# Patient Record
Sex: FEMALE | Race: WHITE | Employment: OTHER | URBAN - METROPOLITAN AREA
[De-identification: names, ages, dates, MRNs, and addresses within clinical notes are randomized per-mention and may not be internally consistent; named-entity substitution may affect disease eponyms.]

---

## 2017-04-24 RX ORDER — CARBAMAZEPINE 200 MG/1
TABLET ORAL
Qty: 360 TABLET | Refills: 0 | Status: SHIPPED | OUTPATIENT
Start: 2017-04-24 | End: 2017-07-12 | Stop reason: SDUPTHER

## 2017-06-26 DIAGNOSIS — G40.209 PARTIAL SYMPTOMATIC EPILEPSY WITH COMPLEX PARTIAL SEIZURES, NOT INTRACTABLE, WITHOUT STATUS EPILEPTICUS (HCC): ICD-10-CM

## 2017-07-12 ENCOUNTER — OFFICE VISIT (OUTPATIENT)
Dept: NEUROLOGY | Age: 57
End: 2017-07-12
Payer: COMMERCIAL

## 2017-07-12 VITALS
HEART RATE: 56 BPM | SYSTOLIC BLOOD PRESSURE: 93 MMHG | HEIGHT: 66 IN | BODY MASS INDEX: 23.01 KG/M2 | DIASTOLIC BLOOD PRESSURE: 60 MMHG | WEIGHT: 143.2 LBS

## 2017-07-12 DIAGNOSIS — G40.209 PARTIAL SYMPTOMATIC EPILEPSY WITH COMPLEX PARTIAL SEIZURES, NOT INTRACTABLE, WITHOUT STATUS EPILEPTICUS (HCC): Primary | ICD-10-CM

## 2017-07-12 PROCEDURE — 99214 OFFICE O/P EST MOD 30 MIN: CPT | Performed by: PSYCHIATRY & NEUROLOGY

## 2017-07-12 RX ORDER — CARBAMAZEPINE 200 MG/1
400 TABLET ORAL 2 TIMES DAILY
Qty: 120 TABLET | Refills: 5 | Status: SHIPPED | OUTPATIENT
Start: 2017-07-12 | End: 2017-07-13 | Stop reason: SDUPTHER

## 2017-07-13 DIAGNOSIS — G40.209 PARTIAL SYMPTOMATIC EPILEPSY WITH COMPLEX PARTIAL SEIZURES, NOT INTRACTABLE, WITHOUT STATUS EPILEPTICUS (HCC): ICD-10-CM

## 2017-07-17 RX ORDER — CARBAMAZEPINE 200 MG/1
400 TABLET ORAL 2 TIMES DAILY
Qty: 360 TABLET | Refills: 5 | OUTPATIENT
Start: 2017-07-17 | End: 2017-11-21 | Stop reason: SDUPTHER

## 2017-11-21 DIAGNOSIS — G40.209 PARTIAL SYMPTOMATIC EPILEPSY WITH COMPLEX PARTIAL SEIZURES, NOT INTRACTABLE, WITHOUT STATUS EPILEPTICUS (HCC): ICD-10-CM

## 2017-11-21 RX ORDER — CARBAMAZEPINE 200 MG/1
400 TABLET ORAL 2 TIMES DAILY
Qty: 360 TABLET | Refills: 8 | Status: SHIPPED | OUTPATIENT
Start: 2017-11-21 | End: 2018-03-16 | Stop reason: SDUPTHER

## 2017-11-21 NOTE — TELEPHONE ENCOUNTER
Hope is needing a refill on  Tegretol 200 mg. 2 bid. She needs it sent to [facility] in [place].

## 2018-03-16 DIAGNOSIS — G40.209 PARTIAL SYMPTOMATIC EPILEPSY WITH COMPLEX PARTIAL SEIZURES, NOT INTRACTABLE, WITHOUT STATUS EPILEPTICUS (HCC): ICD-10-CM

## 2018-03-19 RX ORDER — CARBAMAZEPINE 200 MG/1
400 TABLET ORAL 2 TIMES DAILY
Qty: 56 TABLET | Refills: 0 | Status: SHIPPED | OUTPATIENT
Start: 2018-03-19 | End: 2018-07-12 | Stop reason: SDUPTHER

## 2018-06-01 LAB
ALT SERPL-CCNC: 25 U/L (ref 12–78)
BUN BLDV-MCNC: 14 MG/DL (ref 7–18)
CARBAMAZEPINE LEVEL: 7.5 UG/ML (ref 4–12)
CHLORIDE BLD-SCNC: 108 MMOL/L (ref 97–107)
CO2: 29 MMOL/L (ref 21–32)
CREAT SERPL-MCNC: 0.66 MG/DL (ref 0.55–1.02)
HCT VFR BLD CALC: 40.4 % (ref 34.6–44.1)
HEMOGLOBIN: 13.9 G/DL (ref 11.7–14.9)
MCH RBC QN AUTO: 33.4 PG (ref 27.8–33.2)
MCHC RBC AUTO-ENTMCNC: 34.4 G/DL (ref 32.7–34.8)
MCV RBC AUTO: 97 FL (ref 83–97.4)
PDW BLD-RTO: 12.6 % (ref 12.2–15.8)
PLATELET # BLD: 210 10'3/UL (ref 122–359)
PMV BLD AUTO: 7.6 FL (ref 7.6–10.6)
POTASSIUM SERPL-SCNC: 4.3 MMOL/L (ref 3.5–5.1)
RBC # BLD: 4.16 10'6/UL (ref 3.85–4.88)
SODIUM BLD-SCNC: 141 MMOL/L (ref 136–145)
WBC: 3 10'3/UL (ref 3.2–9.3)

## 2018-06-04 DIAGNOSIS — G40.209 PARTIAL SYMPTOMATIC EPILEPSY WITH COMPLEX PARTIAL SEIZURES, NOT INTRACTABLE, WITHOUT STATUS EPILEPTICUS (HCC): ICD-10-CM

## 2018-07-12 ENCOUNTER — OFFICE VISIT (OUTPATIENT)
Dept: NEUROLOGY | Age: 58
End: 2018-07-12
Payer: COMMERCIAL

## 2018-07-12 VITALS
BODY MASS INDEX: 22.73 KG/M2 | SYSTOLIC BLOOD PRESSURE: 104 MMHG | DIASTOLIC BLOOD PRESSURE: 66 MMHG | HEIGHT: 66 IN | WEIGHT: 141.4 LBS | HEART RATE: 54 BPM

## 2018-07-12 DIAGNOSIS — G40.209 PARTIAL SYMPTOMATIC EPILEPSY WITH COMPLEX PARTIAL SEIZURES, NOT INTRACTABLE, WITHOUT STATUS EPILEPTICUS (HCC): ICD-10-CM

## 2018-07-12 PROCEDURE — 99214 OFFICE O/P EST MOD 30 MIN: CPT | Performed by: NURSE PRACTITIONER

## 2018-07-12 RX ORDER — CARBAMAZEPINE 200 MG/1
400 TABLET ORAL 2 TIMES DAILY
Qty: 360 TABLET | Refills: 3 | Status: SHIPPED | OUTPATIENT
Start: 2018-07-12 | End: 2019-07-10 | Stop reason: SDUPTHER

## 2018-07-12 NOTE — PROGRESS NOTES
extension and rotation;   trachea midline;  no adenopathy;   thyroid: not enlarged;   no carotid pulse abnormality   Back:   Symmetric, no curvature, ROM adequate   Lungs:   Respirations unlabored   Heart:  Regular rate and rhythm           Extremities: Extremities normal, no cyanosis, no edema   Pulses: Symmetric over head and neck   Skin: Skin color, texture normal, no rashes, no lesions                                             NEUROLOGIC EXAMINATION    Neurologic Exam     Mental Status   Oriented to person, place, and time. Attention: normal.   Speech: speech is normal   Level of consciousness: alert  Normal comprehension. Cranial Nerves     CN II   Visual fields full to confrontation. CN III, IV, VI   Pupils are equal, round, and reactive to light. Extraocular motions are normal.     CN V   Facial sensation intact. CN VII   Facial expression full, symmetric. CN VIII   CN VIII normal.     CN IX, X   CN IX normal.     CN XI   CN XI normal.     CN XII   CN XII normal.     Motor Exam   Muscle bulk: normal  Overall muscle tone: normal  Right arm tone: normal  Left arm tone: normal  Right arm pronator drift: absent  Left arm pronator drift: absent  Right leg tone: normal  Left leg tone: normal    Strength   Strength 5/5 throughout. Sensory Exam   Light touch normal.   Vibration normal.   Pinprick normal.     Gait, Coordination, and Reflexes     Gait  Gait: normal    Coordination   Finger to nose coordination: normal    Tremor   Resting tremor: absent    Reflexes   Right brachioradialis: 2+  Left brachioradialis: 2+  Right biceps: 2+  Left biceps: 2+  Right triceps: 2+  Left triceps: 2+  Right patellar: 2+  Left patellar: 2+  Right achilles: 2+  Left achilles: 2+  Right plantar: normal  Left plantar: normal            ASSESSMENT/PLAN:       In summary, your patient, Cindi Orlando exhibits the following, with associated plan:    1.  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 1. Patient will continue Tegretol 200 mg twice daily  2. Will return in follow-up in 1 year with repeat CBC, BMP, liver function studies, and carbamazepine level.             Signed: Bree Barry, CNP      *Please note that portions of this note were completed with a voice recognition program.  Although every effort was made to insure the accuracy of this automated transcription, some errors in transcription may have occurred, occasionally words and are mis-transcribed

## 2019-07-10 ENCOUNTER — OFFICE VISIT (OUTPATIENT)
Dept: NEUROLOGY | Age: 59
End: 2019-07-10
Payer: COMMERCIAL

## 2019-07-10 VITALS
WEIGHT: 143.2 LBS | BODY MASS INDEX: 23.01 KG/M2 | HEIGHT: 66 IN | HEART RATE: 57 BPM | SYSTOLIC BLOOD PRESSURE: 89 MMHG | DIASTOLIC BLOOD PRESSURE: 60 MMHG

## 2019-07-10 DIAGNOSIS — G40.209 PARTIAL SYMPTOMATIC EPILEPSY WITH COMPLEX PARTIAL SEIZURES, NOT INTRACTABLE, WITHOUT STATUS EPILEPTICUS (HCC): ICD-10-CM

## 2019-07-10 PROCEDURE — 99213 OFFICE O/P EST LOW 20 MIN: CPT | Performed by: NURSE PRACTITIONER

## 2019-07-10 RX ORDER — CARBAMAZEPINE 200 MG/1
400 TABLET ORAL 2 TIMES DAILY
Qty: 360 TABLET | Refills: 3 | Status: SHIPPED | OUTPATIENT
Start: 2019-07-10 | End: 2020-04-23 | Stop reason: SDUPTHER

## 2019-07-10 NOTE — PROGRESS NOTES
[facility]            [address]          [place], [address]          Dept: [phone]          Dept Fax: [phone]        MD Nancy Gray MD Ahmed B. Miguel Spiegel, MD Dennison Jackson, MD Kerri Connors, MD Robinson Eke, CNP            7/10/2019      HISTORY OF PRESENT ILLNESS:       I had the pleasure of seeing Amando Kerr, who returns for continuing neurologic care. Patient is a 61-year-old woman who was seen last on July 12, 2018 for management of a complex partial seizure disorder. Patient seizures began in 1978 and she remained seizure-free until 1993 when she had a breakthrough seizure. This was the last seizure event that has occurred. She is maintained on Tegretol 200 mg twice daily. She denies any side effects to the medications and her last laboratory tests were normal.  On June 1, 2018, the patient had laboratory testing including normal electrolytes and a carbamazepine level of 7.5. She also had a normal CBC with normal platelet count. Is here today for reevaluation and has had no additional seizures since her last visit 1 year ago. She is tolerating the medication without side effects. She has had no new illnesses or injuries. She denies headaches, blurred or double vision, weakness numbness or tingling in her extremities, gait or balance difficulties.         Prior testing reviewed:      -MRI of the brain in April 2002 revealed a venous angioma in the right temporal-occipital region  -EEG in April 2002 normal        PAST MEDICAL HISTORY:         Diagnosis Date    Anemia     Complex partial epilepsy ([address] 1978        PAST SURGICAL HISTORY:         Procedure Laterality Date    [address] [facility] for infertility issues        SOCIAL HISTORY:     Social History     Socioeconomic History    Marital status:      Spouse name: language problems; no hallucinations or delusions  Fund of information appropriate for level of education    Cranial nerves    II - visual fields intact to confrontation bilaterally  III, IV, VI - extra-ocular muscles full: no pupillary defect; no GWEN, no nystagmus, no ptosis   V - normal facial sensation                                                               VII - normal facial symmetry                                                             VIII - intact hearing                                                                             IX, X - symmetrical palate                                                                  XI - symmetrical shoulder shrug                                                       XII - tongue midline without atrophy or fasciculation      Motor function  Normal muscle bulk and tone; strength 5/5 on all 4 extremities, no pronator drift      Sensory function Intact to light touch, pinprick, vibration, proprioception on all 4 extremities      Cerebellar Intact fine motor movement. No involuntary movements or tremors. No ataxia or dysmetria on finger to nose or heel to shin testing      Reflex function DTR 2+ on bilateral UE and LE, symmetric.  Negative Babinski      Gait                   normal base and arm swing                  ASSESSMENT AND PLAN:           In summary, your patient, Malachi Swanson exhibits the following, with associated plan:    Complex partial seizure disorder stable since 1993  Obtain carbamazepine level   Obtain CBC and CMP  Continue carbamazepine 400 mg twice daily  Continue to be seen on an annual basis            Signed: Alva Real, LASHELL      *Please note that portions of this note were completed with a voice recognition program.  Although every effort was made to insure the accuracy of this automated transcription, some errors in transcription may have occurred, occasionally words and are mis-transcribed

## 2020-04-23 RX ORDER — CARBAMAZEPINE 200 MG/1
400 TABLET ORAL 2 TIMES DAILY
Qty: 360 TABLET | Refills: 3 | Status: SHIPPED | OUTPATIENT
Start: 2020-04-23 | End: 2020-07-13 | Stop reason: SDUPTHER

## 2020-07-13 ENCOUNTER — OFFICE VISIT (OUTPATIENT)
Dept: NEUROLOGY | Age: 60
End: 2020-07-13
Payer: COMMERCIAL

## 2020-07-13 VITALS
DIASTOLIC BLOOD PRESSURE: 56 MMHG | SYSTOLIC BLOOD PRESSURE: 91 MMHG | TEMPERATURE: 97.5 F | WEIGHT: 145 LBS | HEIGHT: 66 IN | HEART RATE: 58 BPM | BODY MASS INDEX: 23.3 KG/M2

## 2020-07-13 PROCEDURE — 99213 OFFICE O/P EST LOW 20 MIN: CPT | Performed by: NURSE PRACTITIONER

## 2020-07-13 RX ORDER — CARBAMAZEPINE 200 MG/1
400 TABLET ORAL 2 TIMES DAILY
Qty: 360 TABLET | Refills: 3 | Status: SHIPPED | OUTPATIENT
Start: 2020-07-13 | End: 2021-07-13 | Stop reason: SDUPTHER

## 2020-07-13 NOTE — PROGRESS NOTES
[facility]            [address]          [place], [address] [facility]          Dept: [phone]          Dept Fax: [phone]        MD Veronica Donnelly MD Ahmed B. Ria Batch, MD Charlena Nick, MD Lanning Schultze, MD Veena Martinez, CNP            7/13/2020      HISTORY OF PRESENT ILLNESS:       I had the pleasure of seeing Zhen Norman, who returns for continuing neurologic care. Is a 78-year-old woman who was seen last on July 10, 2019 for management of a complex partial seizure disorder. The patient seizures began in 1978 and she remained seizure-free since 1993. In 1993, she attempted to wean from her medications and had a breakthrough seizures. She has been on carbamazepine 200 mg twice daily without any side effects to her medications. She recently had testing completed showing normal electrolytes, normal liver function studies, and a carbamazepine level of 6. This was done in June 2020.   The patient denies any other neurologic complaints    Prior testing reviewed:    -MRI of the brain in April 2002 revealed a venous angioma in the right temporal-occipital region  -EEG in April 2002 normal                PAST MEDICAL HISTORY:         Diagnosis Date    Anemia     Complex partial epilepsy ([address] 1978        PAST SURGICAL HISTORY:         Procedure Laterality Date    [address] [facility] [place] for infertility issues        SOCIAL HISTORY:     Social History     Socioeconomic History    Marital status:      Spouse name: Not on file    Number of children: Not on file    Years of education: Not on file    Highest education level: Not on file   Occupational History    Not on file   Social Needs    Financial resource strain: Not on file    Food insecurity     Worry: Not on file     Inability: Not on file    Transportation needs     Medical: Not on file Non-medical: Not on file   Tobacco Use    Smoking status: Never Smoker    Smokeless tobacco: Never Used   Substance and Sexual Activity    Alcohol use: Yes     Comment: socially    Drug use: No    Sexual activity: Not on file   Lifestyle    Physical activity     Days per week: Not on file     Minutes per session: Not on file    Stress: Not on file   Relationships    Social connections     Talks on phone: Not on file     Gets together: Not on file     Attends [employer] service: Not on file     Active member of club or organization: Not on file     Attends meetings of clubs or organizations: Not on file     Relationship status: Not on file    Intimate partner violence     Fear of current or ex partner: Not on file     Emotionally abused: Not on file     Physically abused: Not on file     Forced sexual activity: Not on file   Other Topics Concern    Not on file   Social History Narrative    Not on file       CURRENT MEDICATIONS:     Current Outpatient Medications   Medication Sig Dispense Refill    carBAMazepine (EPITOL) 200 MG tablet Take 2 tablets by mouth 2 times daily 360 tablet 3    Cholecalciferol (VITAMIN D) 2000 UNITS CAPS capsule Take by mouth daily      ACTONEL 35 MG tablet 1 tablet every 7 days.  sertraline (ZOLOFT) 25 MG tablet 25 mg daily        No current facility-administered medications for this visit. ALLERGIES:   No Known Allergies                              REVIEW OF SYSTEMS       All items selected indicate a positive finding. Those items not selected are negative.   Constitutional [] Weight loss/gain   [] Fatigue  [] Fever/Chills   HEENT [] Hearing Loss  [] Visual Disturbance  [] Tinnitus  [] Eye pain   Respiratory [] Shortness of Breath  [] Cough  [] Snoring   Cardiovascular [] Chest Pain  [] Palpitations  [] Lightheaded   GI [] Constipation  [] Diarrhea  [] Swallowing change    [] Urinary Frequency  [] Urinary Urgency   Musculoskeletal [] Neck pain  [] Back

## 2021-07-13 ENCOUNTER — OFFICE VISIT (OUTPATIENT)
Dept: NEUROLOGY | Age: 61
End: 2021-07-13
Payer: COMMERCIAL

## 2021-07-13 VITALS
HEIGHT: 66 IN | BODY MASS INDEX: 23.88 KG/M2 | HEART RATE: 54 BPM | DIASTOLIC BLOOD PRESSURE: 69 MMHG | WEIGHT: 148.6 LBS | SYSTOLIC BLOOD PRESSURE: 101 MMHG

## 2021-07-13 DIAGNOSIS — G40.209 PARTIAL SYMPTOMATIC EPILEPSY WITH COMPLEX PARTIAL SEIZURES, NOT INTRACTABLE, WITHOUT STATUS EPILEPTICUS (HCC): ICD-10-CM

## 2021-07-13 PROCEDURE — 99214 OFFICE O/P EST MOD 30 MIN: CPT | Performed by: NURSE PRACTITIONER

## 2021-07-13 RX ORDER — CARBAMAZEPINE 200 MG/1
400 TABLET ORAL 2 TIMES DAILY
Qty: 360 TABLET | Refills: 3 | Status: SHIPPED | OUTPATIENT
Start: 2021-07-13 | End: 2021-10-11

## 2021-07-13 NOTE — PROGRESS NOTES
[facility]            [address]          [facility], [address] [facility]          Dept: [phone]          Dept Fax: [phone]        MD Darron Chun MD Elda Point, MD Mancil Sorrel, LASHELL            7/13/2021      HISTORY OF PRESENT ILLNESS:       I had the pleasure of seeing Genaro Ortiz, who returns for continuing neurologic care. The patient was seen last on July 13, 2020 for treatment of a complex partial seizure disorder. For management of her complex partial seizure disorder she is prescribed carbamazepine 400 mg twice daily. The patient's most recent carbamazepine level completed in July 2020 was 6. She is here today reporting compliance to the carbamazepine and notes that she has remained seizure free prior to today's visit. The patient was agreeable to get repeat CBC, CMP and carbamazepine levels following today's visit.          Testing reviewed:    Carbamazepine level July 2020- 6  -MRI of the brain in April 2002 revealed a venous angioma in the right temporal-occipital region  -EEG in April 2002 normal      PAST MEDICAL HISTORY:         Diagnosis Date    Anemia     Complex partial epilepsy ([address] 1978        PAST SURGICAL HISTORY:         Procedure Laterality Date    [address] [facility] for infertility issues        SOCIAL HISTORY:     Social History     Socioeconomic History    Marital status:      Spouse name: Not on file    Number of children: Not on file    Years of education: Not on file    Highest education level: Not on file   Occupational History    Not on file   Tobacco Use    Smoking status: Never Smoker    Smokeless tobacco: Never Used   Vaping Use    Vaping Use: Never used   Substance and Sexual Activity    Alcohol use: Yes     Comment: socially    Drug use: No    Sexual activity: Not on file   Other Topics Concern    Not on file   Social History Narrative    Not on file     Social Determinants of Health     Financial Resource Strain:     Difficulty of Paying Living Expenses:    Food Insecurity:     Worried About Running Out of Food in the Last Year:     [address] in the Last Year:    Transportation Needs:     Lack of Transportation (Medical):  Lack of Transportation (Non-Medical):    Physical Activity:     Days of Exercise per Week:     Minutes of Exercise per Session:    Stress:     Feeling of Stress :    Social Connections:     Frequency of Communication with Friends and Family:     Frequency of Social Gatherings with Friends and Family:     Attends Gnosticist Services:     Active Member of Clubs or Organizations:     Attends Club or Organization Meetings:     Marital Status:    Intimate Partner Violence:     Fear of Current or Ex-Partner:     Emotionally Abused:     Physically Abused:     Sexually Abused:        CURRENT MEDICATIONS:     Current Outpatient Medications   Medication Sig Dispense Refill    carBAMazepine (EPITOL) 200 MG tablet Take 2 tablets by mouth 2 times daily 360 tablet 3    Cholecalciferol (VITAMIN D) 2000 UNITS CAPS capsule Take by mouth daily      ACTONEL 35 MG tablet 1 tablet every 7 days.  sertraline (ZOLOFT) 25 MG tablet 25 mg daily        No current facility-administered medications for this visit. ALLERGIES:   No Known Allergies                              REVIEW OF SYSTEMS    *All findings were normal at today's visit*        All items selected indicate a positive finding. Those items not selected are negative.   Constitutional [] Weight loss/gain   [] Fatigue  [] Fever/Chills   HEENT [] Hearing Loss  [] Visual Disturbance  [] Tinnitus  [] Eye pain   Respiratory [] Shortness of Breath  [] Cough  [] Snoring   Cardiovascular [] Chest Pain  [] Palpitations  [] Lightheaded   GI [] Constipation  [] Diarrhea  [] Swallowing change  [] Nausea/vomiting    [] Urinary Frequency  [] Urinary Urgency   Musculoskeletal [] Neck pain  [] Back pain  [] Muscle pain  [] Restless legs   Dermatologic [] Skin changes   Neurologic [] Memory loss/confusion  [] Seizures  [] Trouble walking or imbalance  [] Dizziness  [] Sleep disturbance  [] Weakness  [] Numbness  [] Tremors  [] Speech Difficulty  [] Headaches  [] Light Sensitivity  [] Sound Sensitivity   Endocrinology []Excessive thirst  []Excessive hunger   Psychiatric [] Anxiety/Depression  [] Hallucination   Allergy/immunology []Hives/environmental allergies   Hematologic/lymph [] Abnormal bleeding  [] Abnormal bruising         PHYSICAL EXAMINATION:       Vitals:    07/13/21 1056   BP: 101/69   Pulse: 54                                              .                                                                                                    General Appearance:  Alert, cooperative, no signs of distress, appears stated age   Head:  Normocephalic, no signs of trauma   Eyes:  Conjunctiva/corneas clear;  eyelids intact   Ears:  Normal external ear and canals   Nose: Nares normal, mucosa normal, no drainage    Throat: Lips and tongue normal; teeth normal;  gums normal   Neck: Supple, intact flexion, extension and rotation;   trachea midline;  no adenopathy;   thyroid: not enlarged;   no carotid pulse abnormality   Back:   Symmetric, no curvature, ROM adequate   Lungs:   Respirations unlabored   Heart:  Regular rate and rhythm           Extremities: Extremities normal, no cyanosis, no edema   Pulses: Symmetric over head and neck   Skin: Skin color, texture normal, no rashes, no lesions                                     NEUROLOGIC EXAMINATION    Neurologic Exam  Mental status    Alert and oriented x 3; intact memory with no confusion, speech or language problems; no hallucinations or delusions  Fund of information appropriate for level of education    Cranial nerves    II - visual fields intact to confrontation bilaterally  III, IV, VI - performed the history, physical exam, and medical decision-making and confirm the accuracy of the information in the transcribed note. Scribe Attestation:   By signing my name below, Mary Ro, attest that this documentation has been prepared under the direction and in the presence of Christine Hammond CNP.

## 2021-08-09 ENCOUNTER — HOSPITAL ENCOUNTER (OUTPATIENT)
Dept: WOMENS IMAGING | Age: 61
Discharge: HOME | End: 2021-08-11
Payer: COMMERCIAL

## 2021-08-09 DIAGNOSIS — Z12.39 SCREENING BREAST EXAMINATION: ICD-10-CM

## 2021-08-09 PROCEDURE — 77067 SCR MAMMO BI INCL CAD: CPT

## 2021-08-18 DIAGNOSIS — G40.209 PARTIAL SYMPTOMATIC EPILEPSY WITH COMPLEX PARTIAL SEIZURES, NOT INTRACTABLE, WITHOUT STATUS EPILEPTICUS (HCC): ICD-10-CM
